# Patient Record
Sex: MALE | Race: WHITE | Employment: OTHER | URBAN - METROPOLITAN AREA
[De-identification: names, ages, dates, MRNs, and addresses within clinical notes are randomized per-mention and may not be internally consistent; named-entity substitution may affect disease eponyms.]

---

## 2017-06-26 ENCOUNTER — OFFICE VISIT (OUTPATIENT)
Dept: INTERNAL MEDICINE | Facility: CLINIC | Age: 47
End: 2017-06-26
Payer: COMMERCIAL

## 2017-06-26 VITALS
HEIGHT: 69 IN | OXYGEN SATURATION: 95 % | SYSTOLIC BLOOD PRESSURE: 134 MMHG | TEMPERATURE: 98.5 F | BODY MASS INDEX: 32.14 KG/M2 | HEART RATE: 104 BPM | WEIGHT: 217 LBS | DIASTOLIC BLOOD PRESSURE: 88 MMHG

## 2017-06-26 DIAGNOSIS — M77.11 RIGHT TENNIS ELBOW: Primary | ICD-10-CM

## 2017-06-26 PROCEDURE — 99213 OFFICE O/P EST LOW 20 MIN: CPT | Performed by: FAMILY MEDICINE

## 2017-06-26 NOTE — PROGRESS NOTES
"CHIEF COMPLAINT    Pain in R elbow      HISTORY    He has had persistent pain just distal to R elbow for several weeks. It seems to have [place] Pain is worse with gripping.     Patient Active Problem List   Diagnosis     FAMILY HX GI MALIGNANCY     Neoplasm of bladder     CARDIOVASCULAR SCREENING; LDL GOAL LESS THAN 160     Night sweats     Tinea of the body     No current outpatient prescriptions on file.       REVIEW OF SYSTEMS    Unremarkable except as above.      EXAM  /88  Pulse 104  Temp 98.5  F (36.9  C) (Oral)  Ht 5' 9\" (1.753 m)  Wt 217 lb (98.4 kg)  SpO2 95%  BMI 32.05 kg/m2    He is tender and has slight swelling just distal to lat epicondyle along anterior portion of ulna.      (M77.11) Right tennis elbow  (primary encounter diagnosis)  Comment: variation of same  Plan:     Discussed rest, tennis elbow strap, possibly anti inflammatory.  F/U if not improving.      "

## 2017-06-26 NOTE — NURSING NOTE
"Chief Complaint   Patient presents with     Pain     Pain in the Rt forearm, concerned of torn tendom       Initial /88  Pulse 104  Temp 98.5  F (36.9  C) (Oral)  Ht 5' 9\" (1.753 m)  Wt 217 lb (98.4 kg)  SpO2 95%  BMI 32.05 kg/m2 Estimated body mass index is 32.05 kg/(m^2) as calculated from the following:    Height as of this encounter: 5' 9\" (1.753 m).    Weight as of this encounter: 217 lb (98.4 kg).  Medication Reconciliation: complete   Tawanna Patel MA      "

## 2017-06-26 NOTE — MR AVS SNAPSHOT
"              After Visit Summary   6/26/2017    Sharif Becerra    MRN: [id number]           Patient Information     Date Of Birth          1970        Visit Information        Provider Department      6/26/2017 1:20 PM Valente Sanchez MD [facility]        Today's Diagnoses     Right tennis elbow    -  1       Follow-ups after your visit        Who to contact     If you have questions or need follow up information about today's clinic visit or your schedule please contact [facility] directly at [phone].  Normal or non-critical lab and imaging results will be communicated to you by [employer], letter or phone within 4 business days after the clinic has received the results. If you do not hear from us within 7 days, please contact the clinic through [employer] or phone. If you have a critical or abnormal lab result, we will notify you by phone as soon as possible.  Submit refill requests through [employer]inks[employer] or call your pharmacy and they will forward the refill request to us. Please allow 3 business days for your refill to be completed.          Additional Information About Your Visit        MyChart Information     [employer]port gives you secure access to your electronic health record. If you see a primary care provider, you can also send messages to your care team and make appointments. If you have questions, please call your primary care clinic.  If you do not have a primary care provider, please call [phone] and they will assist you.        Care EveryWhere ID     This is your Care EveryWhere ID. This could be used by other organizations to access your [place] medical records  [id number]        Your Vitals Were     Pulse Temperature Height Pulse Oximetry BMI (Body Mass Index)       104 98.5  F (36.9  C) (Oral) 5' 9\" (1.753 m) 95% 32.05 kg/m2        Blood Pressure from Last 3 Encounters:   06/26/17 134/88   05/27/16 142/78   11/27/15 (!) 132/92    Weight from Last 3 " Encounters:   06/26/17 217 lb (98.4 kg)   05/27/16 204 lb 3.2 oz (92.6 kg)   11/27/15 208 lb 6.4 oz (94.5 kg)              Today, you had the following     No orders found for display       Primary Care Provider Office Phone # Fax #    Jag Roque [phone]       [facility] [address] [facility] [address]        Equal Access to Services     KIMBERLY WHITT : Hadii aad ku hadasho Soomaali, waaxda luqadaha, qaybta kaalmada adeegyada, waxay idiin hayaan adeeg rolando boothe . So [facility] [phone].    ATENCIÓN: Si habla español, tiene a vincent disposición servicios gratuitos de asistencia lingüística. Llame al [phone].    We comply with applicable federal civil rights laws and [place] laws. We do not discriminate on the basis of race, color, national origin, age, disability sex, sexual orientation or gender identity.            Thank you!     Thank you for choosing [facility]  for your care. Our goal is always to provide you with excellent care. Hearing back from our patients is one way we can continue to improve our services. Please take a few minutes to complete the written survey that you may receive in the mail after your visit with us. Thank you!             Your Updated Medication List - Protect others around you: Learn how to safely use, store and throw away your medicines at www.disposemymeds.org.      Notice  As of 6/26/2017  1:48 PM    You have not been prescribed any medications.

## 2019-12-16 ENCOUNTER — HEALTH MAINTENANCE LETTER (OUTPATIENT)
Age: 49
End: 2019-12-16

## 2021-01-15 ENCOUNTER — HEALTH MAINTENANCE LETTER (OUTPATIENT)
Age: 51
End: 2021-01-15

## 2021-09-05 ENCOUNTER — HEALTH MAINTENANCE LETTER (OUTPATIENT)
Age: 51
End: 2021-09-05

## 2022-02-20 ENCOUNTER — HEALTH MAINTENANCE LETTER (OUTPATIENT)
Age: 52
End: 2022-02-20

## 2022-03-29 ENCOUNTER — OFFICE VISIT (OUTPATIENT)
Dept: FAMILY MEDICINE | Facility: CLINIC | Age: 52
End: 2022-03-29
Payer: COMMERCIAL

## 2022-03-29 VITALS
SYSTOLIC BLOOD PRESSURE: 138 MMHG | TEMPERATURE: 98.3 F | WEIGHT: 219 LBS | BODY MASS INDEX: 32.44 KG/M2 | DIASTOLIC BLOOD PRESSURE: 90 MMHG | OXYGEN SATURATION: 97 % | HEIGHT: 69 IN | HEART RATE: 83 BPM

## 2022-03-29 DIAGNOSIS — Z11.59 NEED FOR HEPATITIS C SCREENING TEST: ICD-10-CM

## 2022-03-29 DIAGNOSIS — I25.10 CORONARY ARTERY DISEASE INVOLVING NATIVE HEART, UNSPECIFIED VESSEL OR LESION TYPE, UNSPECIFIED WHETHER ANGINA PRESENT: ICD-10-CM

## 2022-03-29 DIAGNOSIS — G89.29 CHRONIC LEFT SHOULDER PAIN: ICD-10-CM

## 2022-03-29 DIAGNOSIS — Z85.51 PERSONAL HISTORY OF MALIGNANT NEOPLASM OF BLADDER: ICD-10-CM

## 2022-03-29 DIAGNOSIS — Z12.5 SCREENING FOR PROSTATE CANCER: ICD-10-CM

## 2022-03-29 DIAGNOSIS — Z12.11 SCREEN FOR COLON CANCER: ICD-10-CM

## 2022-03-29 DIAGNOSIS — Z00.00 ANNUAL PHYSICAL EXAM: Primary | ICD-10-CM

## 2022-03-29 DIAGNOSIS — R25.2 LEG CRAMP: ICD-10-CM

## 2022-03-29 DIAGNOSIS — E78.2 MODERATE MIXED HYPERLIPIDEMIA NOT REQUIRING STATIN THERAPY: ICD-10-CM

## 2022-03-29 DIAGNOSIS — M25.512 CHRONIC LEFT SHOULDER PAIN: ICD-10-CM

## 2022-03-29 DIAGNOSIS — I10 ESSENTIAL HYPERTENSION: ICD-10-CM

## 2022-03-29 DIAGNOSIS — Z11.4 SCREENING FOR HIV (HUMAN IMMUNODEFICIENCY VIRUS): ICD-10-CM

## 2022-03-29 DIAGNOSIS — Z11.3 ROUTINE SCREENING FOR STI (SEXUALLY TRANSMITTED INFECTION): ICD-10-CM

## 2022-03-29 DIAGNOSIS — G89.29 CHRONIC LEFT-SIDED LOW BACK PAIN WITHOUT SCIATICA: ICD-10-CM

## 2022-03-29 DIAGNOSIS — N32.89 MASS OF URINARY BLADDER: ICD-10-CM

## 2022-03-29 DIAGNOSIS — M54.50 CHRONIC LEFT-SIDED LOW BACK PAIN WITHOUT SCIATICA: ICD-10-CM

## 2022-03-29 PROBLEM — E53.8 VITAMIN B12 DEFICIENCY (NON ANEMIC): Status: ACTIVE | Noted: 2022-03-29

## 2022-03-29 PROBLEM — E78.5 HYPERLIPIDEMIA: Status: ACTIVE | Noted: 2017-05-04

## 2022-03-29 LAB
ALBUMIN SERPL-MCNC: 4.1 G/DL (ref 3.5–5)
ALP SERPL-CCNC: 76 U/L (ref 45–120)
ALT SERPL W P-5'-P-CCNC: 34 U/L (ref 0–45)
ANION GAP SERPL CALCULATED.3IONS-SCNC: 10 MMOL/L (ref 5–18)
AST SERPL W P-5'-P-CCNC: 29 U/L (ref 0–40)
BILIRUB SERPL-MCNC: 0.6 MG/DL (ref 0–1)
BUN SERPL-MCNC: 14 MG/DL (ref 8–22)
CALCIUM SERPL-MCNC: 9.5 MG/DL (ref 8.5–10.5)
CHLORIDE BLD-SCNC: 103 MMOL/L (ref 98–107)
CHOLEST SERPL-MCNC: 197 MG/DL
CO2 SERPL-SCNC: 26 MMOL/L (ref 22–31)
CREAT SERPL-MCNC: 0.83 MG/DL (ref 0.7–1.3)
ERYTHROCYTE [DISTWIDTH] IN BLOOD BY AUTOMATED COUNT: 12 % (ref 10–15)
FASTING STATUS PATIENT QL REPORTED: ABNORMAL
GFR SERPL CREATININE-BSD FRML MDRD: >90 ML/MIN/1.73M2
GLUCOSE BLD-MCNC: 90 MG/DL (ref 70–125)
HCT VFR BLD AUTO: 43 % (ref 40–53)
HDLC SERPL-MCNC: 36 MG/DL
HGB BLD-MCNC: 14.6 G/DL (ref 13.3–17.7)
HIV 1+2 AB+HIV1 P24 AG SERPL QL IA: NEGATIVE
LDLC SERPL CALC-MCNC: 130 MG/DL
MCH RBC QN AUTO: 30.7 PG (ref 26.5–33)
MCHC RBC AUTO-ENTMCNC: 34 G/DL (ref 31.5–36.5)
MCV RBC AUTO: 91 FL (ref 78–100)
PLATELET # BLD AUTO: 314 10E3/UL (ref 150–450)
POTASSIUM BLD-SCNC: 4.2 MMOL/L (ref 3.5–5)
PROT SERPL-MCNC: 8 G/DL (ref 6–8)
PSA SERPL-MCNC: 1.52 UG/L (ref 0–3.5)
RBC # BLD AUTO: 4.75 10E6/UL (ref 4.4–5.9)
SODIUM SERPL-SCNC: 139 MMOL/L (ref 136–145)
TRIGL SERPL-MCNC: 157 MG/DL
TSH SERPL DL<=0.005 MIU/L-ACNC: 1.75 UIU/ML (ref 0.3–5)
WBC # BLD AUTO: 4.8 10E3/UL (ref 4–11)

## 2022-03-29 PROCEDURE — 85027 COMPLETE CBC AUTOMATED: CPT | Performed by: PHYSICIAN ASSISTANT

## 2022-03-29 PROCEDURE — 99214 OFFICE O/P EST MOD 30 MIN: CPT | Mod: 25 | Performed by: PHYSICIAN ASSISTANT

## 2022-03-29 PROCEDURE — G0103 PSA SCREENING: HCPCS | Performed by: PHYSICIAN ASSISTANT

## 2022-03-29 PROCEDURE — 80053 COMPREHEN METABOLIC PANEL: CPT | Performed by: PHYSICIAN ASSISTANT

## 2022-03-29 PROCEDURE — 87389 HIV-1 AG W/HIV-1&-2 AB AG IA: CPT | Performed by: PHYSICIAN ASSISTANT

## 2022-03-29 PROCEDURE — 87591 N.GONORRHOEAE DNA AMP PROB: CPT | Performed by: PHYSICIAN ASSISTANT

## 2022-03-29 PROCEDURE — 90750 HZV VACC RECOMBINANT IM: CPT | Performed by: PHYSICIAN ASSISTANT

## 2022-03-29 PROCEDURE — 99386 PREV VISIT NEW AGE 40-64: CPT | Mod: 25 | Performed by: PHYSICIAN ASSISTANT

## 2022-03-29 PROCEDURE — 84443 ASSAY THYROID STIM HORMONE: CPT | Performed by: PHYSICIAN ASSISTANT

## 2022-03-29 PROCEDURE — 87491 CHLMYD TRACH DNA AMP PROBE: CPT | Performed by: PHYSICIAN ASSISTANT

## 2022-03-29 PROCEDURE — 90471 IMMUNIZATION ADMIN: CPT | Performed by: PHYSICIAN ASSISTANT

## 2022-03-29 PROCEDURE — 86780 TREPONEMA PALLIDUM: CPT | Performed by: PHYSICIAN ASSISTANT

## 2022-03-29 PROCEDURE — 80061 LIPID PANEL: CPT | Performed by: PHYSICIAN ASSISTANT

## 2022-03-29 PROCEDURE — 86803 HEPATITIS C AB TEST: CPT | Performed by: PHYSICIAN ASSISTANT

## 2022-03-29 PROCEDURE — 36415 COLL VENOUS BLD VENIPUNCTURE: CPT | Performed by: PHYSICIAN ASSISTANT

## 2022-03-29 RX ORDER — MAGNESIUM 200 MG
1 TABLET ORAL DAILY
COMMUNITY

## 2022-03-29 RX ORDER — VITAMIN E (DL,TOCOPHERYL ACET) 180 MG
CAPSULE ORAL
COMMUNITY

## 2022-03-29 ASSESSMENT — ENCOUNTER SYMPTOMS
PALPITATIONS: 0
EYE REDNESS: 0
EYE PAIN: 0
CONSTIPATION: 0
BACK PAIN: 1
SORE THROAT: 0
SHORTNESS OF BREATH: 0
FEVER: 0
LIGHT-HEADEDNESS: 0
FREQUENCY: 0
COUGH: 0
FATIGUE: 0
HEARTBURN: 0
HEADACHES: 0
DIARRHEA: 0
PHOTOPHOBIA: 0
NAUSEA: 0
DIZZINESS: 0
SINUS PAIN: 0
HEMATURIA: 0
VOMITING: 0
ABDOMINAL PAIN: 0
ACTIVITY CHANGE: 0
EYE ITCHING: 0
FLANK PAIN: 0
EYE DISCHARGE: 0
CHILLS: 0
DIAPHORESIS: 0
WHEEZING: 0
RHINORRHEA: 0

## 2022-03-29 NOTE — PATIENT INSTRUCTIONS
Please call the radiology dep at [facility] to schedule your test today at [phone]

## 2022-03-29 NOTE — PROGRESS NOTES
SUBJECTIVE:   CC: Sharif Becerra is an 52 year old male who presents for preventative health visit.       Patient has been advised of split billing requirements and indicates understanding: Yes  Sharif is a pleasant 52-year-old male that presents to the clinic today for annual physical and establish care.  He has a past medical history of hypertension, hyperlipidemia, coronary artery disease and personal history of bladder cancer.  He is doing well and has no concerns or questions about his chronic health.  He was following up with cardiology few years back with family history of coronary artery disease and known coronary artery disease.  Around the time he did undergo a Antonio calcium imaging which was 0 per his report.  They also recommended starting statin medication which he trialed but had to discontinue this due to myalgias.  He was diagnosed with bladder cancer when he was around 23 years old and was following up annually.  Last urology follow-up was in November 2020 and a cystoscopy was done at that time.  Per report it was recommended that he follow-up every 2 years for evaluation.  There is also a strong family history of colon cancer in his family.  His father passed away from colon cancer at about age 65.  He started screening with colonoscopies at 30.  His last colonoscopy was November 2020 and this did recommend a 5-year follow-up but he would like to do every 2-year follow-up for this.  Denies any chest pains or shortness of breath.  No nausea vomiting diarrhea constipation.  No bowel habit changes.    He has been having left shoulder pain and lower back pain for about 3 years.  There was a injury around the time.  His left shoulder pain is worse with overhead movements or putting on a coat.  The pain is located over the anterior aspect of the shoulder and does radiate down the arm at times.  He does not note any weakness to the arm.  He is also having lower back pain that radiates down his left  leg.  The pain is in the anterior thigh and he does have some numbness to the lateral aspect of his foot at times.  No weakness to the lower leg.  No recent or new injury.  No bowel or bladder discomfort or abnormalities noted with the lower back pain.    He would also like to do STI screening.  He is asymptomatic today.    Also notes some leg cramping in his calves and thighs.  This has been ongoing for some time.  He has started supplements including vitamin D, multivitamin and B12 which seemed to help his pain a bit.    Healthy Habits:     Getting at least 3 servings of Calcium per day:  Yes    Bi-annual eye exam:  NO    Dental care twice a year:  Yes    Sleep apnea or symptoms of sleep apnea:  Daytime drowsiness    Diet:  Other    Frequency of exercise:  None    Taking medications regularly:  Not Applicable    Medication side effects:  Not applicable    PHQ-2 Total Score: 0    Additional concerns today:  Yes  Musculoskeletal Problem  Pertinent negatives include no abdominal pain, chest pain, chills, congestion, coughing, diaphoresis, fatigue, fever, headaches, nausea, rash, sore throat or vomiting.   STD  Pertinent negatives include no abdominal pain, chest pain, chills, congestion, coughing, diaphoresis, fatigue, fever, headaches, nausea, rash, sore throat or vomiting.   Shoulder Pain  Pertinent negatives include no abdominal pain, chest pain, chills, congestion, coughing, diaphoresis, fatigue, fever, headaches, nausea, rash, sore throat or vomiting.         Today's PHQ-2 Score:   PHQ-2 ( 1999 Pfizer) 3/23/2022   Q1: Little interest or pleasure in doing things 0   Q2: Feeling down, depressed or hopeless 0   PHQ-2 Score 0   Q1: Little interest or pleasure in doing things Not at all   Q2: Feeling down, depressed or hopeless Not at all   PHQ-2 Score 0       Abuse: Current or Past(Physical, Sexual or Emotional)- No  Do you feel safe in your environment? Yes    Have you ever done Advance Care Planning? (For example,  a Health Directive, POLST, or a discussion with a medical provider or your loved ones about your wishes): No, advance care planning information given to patient to review.  Advanced care planning was discussed at today's visit.    Social History     Tobacco Use     Smoking status: Never Smoker     Smokeless tobacco: Never Used   Substance Use Topics     Alcohol use: Yes     Alcohol/week: 0.0 standard drinks     Comment: 1-2 drinks weekly     If you drink alcohol do you typically have >3 drinks per day or >7 drinks per week? No    Alcohol Use 3/29/2022   Prescreen: >3 drinks/day or >7 drinks/week? -   Prescreen: >3 drinks/day or >7 drinks/week? No       Last PSA: No results found for: PSA    Reviewed and updated as needed this visit by clinical staff   Tobacco  Allergies  Meds              Reviewed and updated as needed this visit by Provider                 Past Medical History:   Diagnosis Date     Diarrhea     Hospitalized for dehydration in [place]     Jaundice, unspecified, not of      Improved; wonders if it was hepatitis A; was in [place]     Neoplasm of unspecified nature of bladder     Benign tumor; followed by Urology; see PSH      Past Surgical History:   Procedure Laterality Date     [facility] NONSPECIFIC PROCEDURE  1999    Neg Colonoscopy     [facility] NONSPECIFIC PROCEDURE      TURBT; followed by urology; was benign     [facility] NONSPECIFIC PROCEDURE      S/P Re-attachment left fifth finger at age 7     [facility] NONSPECIFIC PROCEDURE  2005    Vasectomy     OB History   No obstetric history on file.       Review of Systems   Constitutional: Negative for activity change, chills, diaphoresis, fatigue and fever.   HENT: Negative for congestion, ear discharge, ear pain, postnasal drip, rhinorrhea, sinus pain and sore throat.    Eyes: Negative for photophobia, pain, discharge, redness and itching.   Respiratory: Negative for cough, shortness of breath and wheezing.    Cardiovascular: Negative for chest pain  "and palpitations.   Gastrointestinal: Negative for abdominal pain, constipation, diarrhea, heartburn, nausea and vomiting.   Genitourinary: Negative for flank pain, frequency, genital sores, hematuria, impotence, penile pain, penile swelling, scrotal swelling, testicular pain and urgency.   Musculoskeletal: Positive for back pain.        Left shoulder pain x 3 years. Injury 3 years ago. Worse with overhead movements.   Left lower back pain that is down left leg.  Does have some numbness and tingling in foot.  Injury 10 years ago.  No worsening symptoms.   Skin: Negative for rash.   Neurological: Negative for dizziness, light-headedness and headaches.         OBJECTIVE:   BP (!) 138/90   Pulse 83   Temp 98.3  F (36.8  C)   Ht 1.753 m (5' 9\")   Wt 99.3 kg (219 lb)   SpO2 97%   BMI 32.34 kg/m      Physical Exam  Vitals and nursing note reviewed.   Constitutional:       General: He is awake.      Appearance: Normal appearance. He is well-developed and well-groomed. He is not ill-appearing, toxic-appearing or diaphoretic.   HENT:      Head: Normocephalic and atraumatic.      Right Ear: Tympanic membrane, ear canal and external ear normal.      Left Ear: Tympanic membrane, ear canal and external ear normal.      Mouth/Throat:      Mouth: Mucous membranes are moist.      Tongue: No lesions.      Pharynx: Oropharynx is clear.      Tonsils: No tonsillar exudate or tonsillar abscesses.   Eyes:      General: Lids are normal.         Right eye: No discharge.         Left eye: No discharge.      Extraocular Movements: Extraocular movements intact.      Right eye: Normal extraocular motion and no nystagmus.      Left eye: Normal extraocular motion and no nystagmus.      Conjunctiva/sclera: Conjunctivae normal.   Neck:      Trachea: Trachea normal.   Cardiovascular:      Rate and Rhythm: Normal rate and regular rhythm.      Heart sounds: No murmur heard.    No friction rub. No gallop.   Pulmonary:      Effort: Pulmonary " effort is normal. No accessory muscle usage, prolonged expiration or respiratory distress.      Breath sounds: No decreased breath sounds, wheezing, rhonchi or rales.   Abdominal:      General: Bowel sounds are normal.      Palpations: Abdomen is soft.      Tenderness: There is no abdominal tenderness. There is no guarding or rebound.   Musculoskeletal:      Cervical back: Normal, full passive range of motion without pain and neck supple.      Thoracic back: Normal.      Lumbar back: Normal.      Right lower leg: No edema.      Left lower leg: No edema.      Comments: Left shoulder: No pain with palpation throughout the left shoulder.  Weakness to the upper left extremity.  Strong hand grasp to the left hand.  Positive Stockton and Neer's test.    Lower back: Mild pain with the palpation to the paravertebral muscles.  No weakness to the lower leg.  No bowel or bladder issues.   Lymphadenopathy:      Cervical: No cervical adenopathy.   Neurological:      Mental Status: He is alert.      Cranial Nerves: Cranial nerves are intact.      Motor: No weakness.      Gait: Gait is intact.      Deep Tendon Reflexes:      Reflex Scores:       Patellar reflexes are 2+ on the right side and 2+ on the left side.  Psychiatric:         Behavior: Behavior is cooperative.           ASSESSMENT/PLAN:       ICD-10-CM    1. Annual physical exam  Z00.00 CBC with platelets     Comprehensive metabolic panel (BMP + Alb, Alk Phos, ALT, AST, Total. Bili, TP)     TSH     CBC with platelets     Comprehensive metabolic panel (BMP + Alb, Alk Phos, ALT, AST, Total. Bili, TP)     TSH   2. Moderate mixed hyperlipidemia not requiring statin therapy  E78.2    3. Essential hypertension  I10    4. Mass of urinary bladder  N32.89    5. Coronary artery disease involving native heart, unspecified vessel or lesion type, unspecified whether angina present  I25.10 Lipid panel reflex to direct LDL Fasting     Lipid panel reflex to direct LDL Fasting   6. Personal  history of malignant neoplasm of bladder  Z85.51    7. Screening for prostate cancer  Z12.5 PSA, screen     PSA, screen   8. Screening for HIV (human immunodeficiency virus)  Z11.4 HIV Antigen Antibody Combo     HIV Antigen Antibody Combo   9. Need for hepatitis C screening test  Z11.59 Hepatitis C Screen Reflex to HCV RNA Quant and Genotype     Hepatitis C Screen Reflex to HCV RNA Quant and Genotype   10. Screen for colon cancer  Z12.11    11. Routine screening for STI (sexually transmitted infection)  Z11.3 Neisseria gonorrhoeae PCR     Chlamydia trachomatis PCR     Treponema Abs w Reflex to RPR and Titer     Neisseria gonorrhoeae PCR     Treponema Abs w Reflex to RPR and Titer   12. Chronic left shoulder pain  M25.512 MR Shoulder Left w/o Contrast    G89.29 CANCELED: MR Shoulder Right w/o Contrast   13. Chronic left-sided low back pain without sciatica  M54.50     G89.29    14. Leg cramp  R25.2      #1 annual physical-no concerns in regards to his chronic health today.  Blood pressure is 130/90.  We will do labs including a CBC, complete metabolic panel, lipid panel, TSH, and iron studies    #2 coronary artery disease-he did see cardiology who recommended statin.  He was on this for a short time but this caused myalgia so has since stopped.  He did have a coronary calcium score of 0 about 4 years ago.  I did recommend repeating this but he would like to hold off at this time.  Diet and exercise were discussed today.  The meats fruits and veggies.  Try to stay active as much as possible.  Limit salt intake and alcohol intake.  We will check a lipid panel today.    #3 hypertension-blood pressure is borderline at 138/90.  It does seem that this is been his baseline for some time.  Diet and exercise were discussed today.  Monitor salt and alcohol intake.  Try to stay as active as possible.    #4 hyperlipidemia-diet and exercise discussed today.  We will check a fasting lipid.He dose not wish to start on any  "medications at this time even if cholesterol is elevated.     #5 history of bladder cancer-he is following up with urology.  Last cystoscopy and evaluation is in 2020 and they recommended a 24 follow-up.  He does plan to follow-up with them at that time.    #6 family history of colon cancer-last colonoscopy was November 2020.  He would like to do colonoscopies every 2 years as his father passed away from colon cancer.  He is not having any symptoms at this time.  Continue to monitor symptoms.    #7 prostate cancer screening-we will check a PSA level today    #8 STI, Hep C and HIV screening -we will check a gonorrhea, chlamydia, trichomonas, HIV, and hepatitis C today.  He is asymptomatic today.    #9 left shoulder pain-he does have pain with overhead movements.  Positive Stockton and Neer's test.  We will get an MRI as its been 3 years with ongoing pain.    #10 lower back pain-I do suspect some radiculopathy here.  Pain does radiate down the leg and does have some numbness and tingling in the toes.  No weakness on exam.  No bowel or bladder issues.  Did recommend MRI but he will keep an eye on this and let me know if he would like this in the.    #11 leg cramping-he currently is on magnesium, B12, and multivitamin.  Since starting these his cramping has improved. Continue with vitamins at this time.    Follow up in year, sooner if needed.     Patient has been advised of split billing requirements and indicates understanding: Yes    COUNSELING:   Reviewed preventive health counseling, as reflected in patient instructions       Regular exercise       Healthy diet/nutrition       Vision screening       Hearing screening       Safe sex practices/STD prevention       Colorectal cancer screening       Prostate cancer screening    Estimated body mass index is 32.34 kg/m  as calculated from the following:    Height as of this encounter: 1.753 m (5' 9\").    Weight as of this encounter: 99.3 kg (219 lb).     Weight management " plan: Discussed healthy diet and exercise guidelines    He reports that he has never smoked. He has never used smokeless tobacco.      Counseling Resources:  ATP IV Guidelines  Pooled Cohorts Equation Calculator  FRAX Risk Assessment  ICSI Preventive Guidelines  Dietary Guidelines for Americans, 2010  USDA's MyPlate  ASA Prophylaxis  Lung CA Screening    DIRK Crowell [facility]

## 2022-03-30 LAB
C TRACH DNA SPEC QL NAA+PROBE: NEGATIVE
HCV AB SERPL QL IA: NONREACTIVE
N GONORRHOEA DNA SPEC QL NAA+PROBE: NEGATIVE
T PALLIDUM AB SER QL: NONREACTIVE

## 2022-04-19 ENCOUNTER — HOSPITAL ENCOUNTER (OUTPATIENT)
Dept: MRI IMAGING | Facility: CLINIC | Age: 52
Discharge: HOME | End: 2022-04-19
Attending: PHYSICIAN ASSISTANT | Admitting: PHYSICIAN ASSISTANT
Payer: COMMERCIAL

## 2022-04-19 DIAGNOSIS — M25.512 CHRONIC LEFT SHOULDER PAIN: ICD-10-CM

## 2022-04-19 DIAGNOSIS — M25.512 CHRONIC LEFT SHOULDER PAIN: Primary | ICD-10-CM

## 2022-04-19 DIAGNOSIS — G89.29 CHRONIC LEFT SHOULDER PAIN: ICD-10-CM

## 2022-04-19 DIAGNOSIS — G89.29 CHRONIC LEFT SHOULDER PAIN: Primary | ICD-10-CM

## 2022-04-19 PROCEDURE — 73221 MRI JOINT UPR EXTREM W/O DYE: CPT | Mod: LT

## 2022-10-23 ENCOUNTER — HEALTH MAINTENANCE LETTER (OUTPATIENT)
Age: 52
End: 2022-10-23

## 2023-04-20 ENCOUNTER — PATIENT OUTREACH (OUTPATIENT)
Dept: CARE COORDINATION | Facility: CLINIC | Age: 53
End: 2023-04-20
Payer: COMMERCIAL

## 2023-06-01 ENCOUNTER — HEALTH MAINTENANCE LETTER (OUTPATIENT)
Age: 53
End: 2023-06-01

## 2024-06-02 ENCOUNTER — HEALTH MAINTENANCE LETTER (OUTPATIENT)
Age: 54
End: 2024-06-02